# Patient Record
Sex: FEMALE | Race: WHITE | Employment: OTHER | URBAN - METROPOLITAN AREA
[De-identification: names, ages, dates, MRNs, and addresses within clinical notes are randomized per-mention and may not be internally consistent; named-entity substitution may affect disease eponyms.]

---

## 2021-01-01 ENCOUNTER — HOSPITAL ENCOUNTER (INPATIENT)
Age: 71
LOS: 1 days | DRG: 871 | End: 2021-10-03
Attending: INTERNAL MEDICINE | Admitting: INTERNAL MEDICINE
Payer: MEDICARE

## 2021-01-01 ENCOUNTER — APPOINTMENT (OUTPATIENT)
Dept: GENERAL RADIOLOGY | Age: 71
DRG: 871 | End: 2021-01-01
Attending: INTERNAL MEDICINE
Payer: MEDICARE

## 2021-01-01 VITALS
WEIGHT: 113.98 LBS | TEMPERATURE: 95.5 F | SYSTOLIC BLOOD PRESSURE: 55 MMHG | HEIGHT: 62 IN | DIASTOLIC BLOOD PRESSURE: 33 MMHG | BODY MASS INDEX: 20.97 KG/M2

## 2021-01-01 LAB
A/G RATIO: 0.5 (ref 1.1–2.2)
ALBUMIN SERPL-MCNC: 1.9 G/DL (ref 3.4–5)
ALP BLD-CCNC: 110 U/L (ref 40–129)
ALT SERPL-CCNC: 10 U/L (ref 10–40)
ANION GAP SERPL CALCULATED.3IONS-SCNC: 14 MMOL/L (ref 3–16)
ANION GAP SERPL CALCULATED.3IONS-SCNC: 15 MMOL/L (ref 3–16)
ANION GAP SERPL CALCULATED.3IONS-SCNC: 17 MMOL/L (ref 3–16)
ANISOCYTOSIS: ABNORMAL
AST SERPL-CCNC: 35 U/L (ref 15–37)
BASOPHILS ABSOLUTE: 0 K/UL (ref 0–0.2)
BASOPHILS RELATIVE PERCENT: 0 %
BILIRUB SERPL-MCNC: <0.2 MG/DL (ref 0–1)
BILIRUBIN URINE: ABNORMAL
BLOOD, URINE: ABNORMAL
BUN BLDV-MCNC: 89 MG/DL (ref 7–20)
BUN BLDV-MCNC: 90 MG/DL (ref 7–20)
BUN BLDV-MCNC: 92 MG/DL (ref 7–20)
CALCIUM SERPL-MCNC: 8.6 MG/DL (ref 8.3–10.6)
CALCIUM SERPL-MCNC: 8.8 MG/DL (ref 8.3–10.6)
CALCIUM SERPL-MCNC: 8.9 MG/DL (ref 8.3–10.6)
CHLORIDE BLD-SCNC: 102 MMOL/L (ref 99–110)
CHLORIDE BLD-SCNC: 103 MMOL/L (ref 99–110)
CHLORIDE BLD-SCNC: 104 MMOL/L (ref 99–110)
CLARITY: ABNORMAL
CO2: 14 MMOL/L (ref 21–32)
CO2: 14 MMOL/L (ref 21–32)
CO2: 17 MMOL/L (ref 21–32)
COARSE CASTS, UA: ABNORMAL /LPF (ref 0–2)
COLOR: ABNORMAL
CREAT SERPL-MCNC: 5.1 MG/DL (ref 0.6–1.2)
CREAT SERPL-MCNC: 5.1 MG/DL (ref 0.6–1.2)
CREAT SERPL-MCNC: 5.2 MG/DL (ref 0.6–1.2)
EOSINOPHILS ABSOLUTE: 0 K/UL (ref 0–0.6)
EOSINOPHILS RELATIVE PERCENT: 0 %
EPITHELIAL CELLS, UA: 4 /HPF (ref 0–5)
GFR AFRICAN AMERICAN: 10
GFR NON-AFRICAN AMERICAN: 8
GLOBULIN: 3.7 G/DL
GLUCOSE BLD-MCNC: 108 MG/DL (ref 70–99)
GLUCOSE BLD-MCNC: 159 MG/DL (ref 70–99)
GLUCOSE BLD-MCNC: 214 MG/DL (ref 70–99)
GLUCOSE URINE: 250 MG/DL
HCT VFR BLD CALC: 28.7 % (ref 36–48)
HEMOGLOBIN: 9.3 G/DL (ref 12–16)
INR BLD: 1.92 (ref 0.88–1.12)
KETONES, URINE: ABNORMAL MG/DL
LACTIC ACID: 1.7 MMOL/L (ref 0.4–2)
LEUKOCYTE ESTERASE, URINE: ABNORMAL
LYMPHOCYTES ABSOLUTE: 4 K/UL (ref 1–5.1)
LYMPHOCYTES RELATIVE PERCENT: 18 %
MAGNESIUM: 2.4 MG/DL (ref 1.8–2.4)
MCH RBC QN AUTO: 32.9 PG (ref 26–34)
MCHC RBC AUTO-ENTMCNC: 32.5 G/DL (ref 31–36)
MCV RBC AUTO: 101.3 FL (ref 80–100)
MICROSCOPIC EXAMINATION: YES
MONOCYTES ABSOLUTE: 0.7 K/UL (ref 0–1.3)
MONOCYTES RELATIVE PERCENT: 3 %
NEUTROPHILS ABSOLUTE: 17.5 K/UL (ref 1.7–7.7)
NEUTROPHILS RELATIVE PERCENT: 79 %
NITRITE, URINE: NEGATIVE
PDW BLD-RTO: 16.6 % (ref 12.4–15.4)
PH UA: 5.5 (ref 5–8)
PHOSPHORUS: 6.3 MG/DL (ref 2.5–4.9)
PLATELET # BLD: 350 K/UL (ref 135–450)
PLATELET SLIDE REVIEW: ADEQUATE
PMV BLD AUTO: 6.9 FL (ref 5–10.5)
POTASSIUM SERPL-SCNC: 5.4 MMOL/L (ref 3.5–5.1)
POTASSIUM SERPL-SCNC: 6.1 MMOL/L (ref 3.5–5.1)
POTASSIUM SERPL-SCNC: 6.1 MMOL/L (ref 3.5–5.1)
PROTEIN UA: >=300 MG/DL
PROTHROMBIN TIME: 22.3 SEC (ref 9.9–12.7)
RBC # BLD: 2.83 M/UL (ref 4–5.2)
RBC UA: >100 /HPF (ref 0–4)
REASON FOR REJECTION: NORMAL
REJECTED TEST: NORMAL
SLIDE REVIEW: ABNORMAL
SODIUM BLD-SCNC: 133 MMOL/L (ref 136–145)
SODIUM BLD-SCNC: 133 MMOL/L (ref 136–145)
SODIUM BLD-SCNC: 134 MMOL/L (ref 136–145)
SPECIFIC GRAVITY UA: 1.02 (ref 1–1.03)
TOTAL PROTEIN: 5.6 G/DL (ref 6.4–8.2)
URINE REFLEX TO CULTURE: ABNORMAL
URINE TYPE: ABNORMAL
UROBILINOGEN, URINE: 0.2 E.U./DL
WBC # BLD: 22.1 K/UL (ref 4–11)
WBC UA: 8 /HPF (ref 0–5)

## 2021-01-01 PROCEDURE — 80053 COMPREHEN METABOLIC PANEL: CPT

## 2021-01-01 PROCEDURE — 83605 ASSAY OF LACTIC ACID: CPT

## 2021-01-01 PROCEDURE — 6370000000 HC RX 637 (ALT 250 FOR IP): Performed by: INTERNAL MEDICINE

## 2021-01-01 PROCEDURE — 94761 N-INVAS EAR/PLS OXIMETRY MLT: CPT

## 2021-01-01 PROCEDURE — 2580000003 HC RX 258: Performed by: INTERNAL MEDICINE

## 2021-01-01 PROCEDURE — 81001 URINALYSIS AUTO W/SCOPE: CPT

## 2021-01-01 PROCEDURE — 94640 AIRWAY INHALATION TREATMENT: CPT

## 2021-01-01 PROCEDURE — 71045 X-RAY EXAM CHEST 1 VIEW: CPT

## 2021-01-01 PROCEDURE — 83735 ASSAY OF MAGNESIUM: CPT

## 2021-01-01 PROCEDURE — 6360000002 HC RX W HCPCS: Performed by: STUDENT IN AN ORGANIZED HEALTH CARE EDUCATION/TRAINING PROGRAM

## 2021-01-01 PROCEDURE — 2580000003 HC RX 258: Performed by: STUDENT IN AN ORGANIZED HEALTH CARE EDUCATION/TRAINING PROGRAM

## 2021-01-01 PROCEDURE — 84100 ASSAY OF PHOSPHORUS: CPT

## 2021-01-01 PROCEDURE — 0BH17EZ INSERTION OF ENDOTRACHEAL AIRWAY INTO TRACHEA, VIA NATURAL OR ARTIFICIAL OPENING: ICD-10-PCS | Performed by: STUDENT IN AN ORGANIZED HEALTH CARE EDUCATION/TRAINING PROGRAM

## 2021-01-01 PROCEDURE — 2700000000 HC OXYGEN THERAPY PER DAY

## 2021-01-01 PROCEDURE — 94002 VENT MGMT INPAT INIT DAY: CPT

## 2021-01-01 PROCEDURE — 2000000000 HC ICU R&B

## 2021-01-01 PROCEDURE — 85610 PROTHROMBIN TIME: CPT

## 2021-01-01 PROCEDURE — 6360000002 HC RX W HCPCS: Performed by: INTERNAL MEDICINE

## 2021-01-01 PROCEDURE — 2500000003 HC RX 250 WO HCPCS: Performed by: INTERNAL MEDICINE

## 2021-01-01 PROCEDURE — 5A1935Z RESPIRATORY VENTILATION, LESS THAN 24 CONSECUTIVE HOURS: ICD-10-PCS | Performed by: STUDENT IN AN ORGANIZED HEALTH CARE EDUCATION/TRAINING PROGRAM

## 2021-01-01 PROCEDURE — 85025 COMPLETE CBC W/AUTO DIFF WBC: CPT

## 2021-01-01 PROCEDURE — 87040 BLOOD CULTURE FOR BACTERIA: CPT

## 2021-01-01 PROCEDURE — 36415 COLL VENOUS BLD VENIPUNCTURE: CPT

## 2021-01-01 RX ORDER — ALBUTEROL SULFATE 90 UG/1
4 AEROSOL, METERED RESPIRATORY (INHALATION) EVERY 4 HOURS PRN
Status: DISCONTINUED | OUTPATIENT
Start: 2021-01-01 | End: 2021-10-04 | Stop reason: HOSPADM

## 2021-01-01 RX ORDER — SODIUM CHLORIDE 9 MG/ML
25 INJECTION, SOLUTION INTRAVENOUS PRN
Status: DISCONTINUED | OUTPATIENT
Start: 2021-01-01 | End: 2021-10-04 | Stop reason: HOSPADM

## 2021-01-01 RX ORDER — SODIUM CHLORIDE 0.9 % (FLUSH) 0.9 %
5-40 SYRINGE (ML) INJECTION PRN
Status: DISCONTINUED | OUTPATIENT
Start: 2021-01-01 | End: 2021-10-04 | Stop reason: HOSPADM

## 2021-01-01 RX ORDER — ALBUTEROL SULFATE 2.5 MG/3ML
10 SOLUTION RESPIRATORY (INHALATION) ONCE
Status: COMPLETED | OUTPATIENT
Start: 2021-01-01 | End: 2021-01-01

## 2021-01-01 RX ORDER — POLYETHYLENE GLYCOL 3350 17 G/17G
17 POWDER, FOR SOLUTION ORAL DAILY PRN
Status: DISCONTINUED | OUTPATIENT
Start: 2021-01-01 | End: 2021-10-04 | Stop reason: HOSPADM

## 2021-01-01 RX ORDER — ONDANSETRON 2 MG/ML
4 INJECTION INTRAMUSCULAR; INTRAVENOUS EVERY 6 HOURS PRN
Status: DISCONTINUED | OUTPATIENT
Start: 2021-01-01 | End: 2021-10-04 | Stop reason: HOSPADM

## 2021-01-01 RX ORDER — ACETAMINOPHEN 650 MG/1
650 SUPPOSITORY RECTAL EVERY 6 HOURS PRN
Status: DISCONTINUED | OUTPATIENT
Start: 2021-01-01 | End: 2021-10-04 | Stop reason: HOSPADM

## 2021-01-01 RX ORDER — ONDANSETRON 4 MG/1
4 TABLET, ORALLY DISINTEGRATING ORAL EVERY 8 HOURS PRN
Status: DISCONTINUED | OUTPATIENT
Start: 2021-01-01 | End: 2021-10-04 | Stop reason: HOSPADM

## 2021-01-01 RX ORDER — DEXTROSE MONOHYDRATE 25 G/50ML
25 INJECTION, SOLUTION INTRAVENOUS ONCE
Status: COMPLETED | OUTPATIENT
Start: 2021-01-01 | End: 2021-01-01

## 2021-01-01 RX ORDER — LORAZEPAM 2 MG/ML
1 INJECTION INTRAMUSCULAR
Status: DISCONTINUED | OUTPATIENT
Start: 2021-01-01 | End: 2021-10-04 | Stop reason: HOSPADM

## 2021-01-01 RX ORDER — LORAZEPAM 2 MG/ML
1 INJECTION INTRAMUSCULAR EVERY 4 HOURS PRN
Status: DISCONTINUED | OUTPATIENT
Start: 2021-01-01 | End: 2021-01-01

## 2021-01-01 RX ORDER — ACETAMINOPHEN 325 MG/1
650 TABLET ORAL EVERY 6 HOURS PRN
Status: DISCONTINUED | OUTPATIENT
Start: 2021-01-01 | End: 2021-10-04 | Stop reason: HOSPADM

## 2021-01-01 RX ORDER — ALBUTEROL SULFATE 2.5 MG/3ML
2.5 SOLUTION RESPIRATORY (INHALATION) EVERY 4 HOURS PRN
Status: DISCONTINUED | OUTPATIENT
Start: 2021-01-01 | End: 2021-10-04 | Stop reason: HOSPADM

## 2021-01-01 RX ORDER — SODIUM CHLORIDE 0.9 % (FLUSH) 0.9 %
5-40 SYRINGE (ML) INJECTION EVERY 12 HOURS SCHEDULED
Status: DISCONTINUED | OUTPATIENT
Start: 2021-01-01 | End: 2021-10-04 | Stop reason: HOSPADM

## 2021-01-01 RX ORDER — NOREPINEPHRINE BIT/0.9 % NACL 16MG/250ML
2-100 INFUSION BOTTLE (ML) INTRAVENOUS CONTINUOUS
Status: DISCONTINUED | OUTPATIENT
Start: 2021-01-01 | End: 2021-10-04 | Stop reason: HOSPADM

## 2021-01-01 RX ORDER — HEPARIN SODIUM 5000 [USP'U]/ML
5000 INJECTION, SOLUTION INTRAVENOUS; SUBCUTANEOUS EVERY 8 HOURS SCHEDULED
Status: DISCONTINUED | OUTPATIENT
Start: 2021-01-01 | End: 2021-01-01

## 2021-01-01 RX ORDER — IPRATROPIUM BROMIDE AND ALBUTEROL SULFATE 2.5; .5 MG/3ML; MG/3ML
1 SOLUTION RESPIRATORY (INHALATION) 4 TIMES DAILY
Status: DISCONTINUED | OUTPATIENT
Start: 2021-01-01 | End: 2021-10-04 | Stop reason: HOSPADM

## 2021-01-01 RX ORDER — CHLORHEXIDINE GLUCONATE 0.12 MG/ML
15 RINSE ORAL 2 TIMES DAILY
Status: DISCONTINUED | OUTPATIENT
Start: 2021-01-01 | End: 2021-01-01

## 2021-01-01 RX ADMIN — Medication 10 ML: at 11:00

## 2021-01-01 RX ADMIN — LORAZEPAM 1 MG: 2 INJECTION INTRAMUSCULAR; INTRAVENOUS at 17:40

## 2021-01-01 RX ADMIN — DEXTROSE MONOHYDRATE 25 G: 25 INJECTION, SOLUTION INTRAVENOUS at 11:38

## 2021-01-01 RX ADMIN — MORPHINE SULFATE 7 MG/HR: 10 INJECTION INTRAVENOUS at 17:33

## 2021-01-01 RX ADMIN — FAMOTIDINE 20 MG: 10 INJECTION, SOLUTION INTRAVENOUS at 10:11

## 2021-01-01 RX ADMIN — Medication 10 ML: at 10:19

## 2021-01-01 RX ADMIN — IPRATROPIUM BROMIDE AND ALBUTEROL SULFATE 1 AMPULE: .5; 3 SOLUTION RESPIRATORY (INHALATION) at 08:03

## 2021-01-01 RX ADMIN — CEFEPIME HYDROCHLORIDE 1000 MG: 1 INJECTION, POWDER, FOR SOLUTION INTRAMUSCULAR; INTRAVENOUS at 11:01

## 2021-01-01 RX ADMIN — INSULIN HUMAN 10 UNITS: 100 INJECTION, SOLUTION PARENTERAL at 11:37

## 2021-01-01 RX ADMIN — FAMOTIDINE 20 MG: 10 INJECTION, SOLUTION INTRAVENOUS at 10:59

## 2021-01-01 RX ADMIN — HEPARIN SODIUM 5000 UNITS: 5000 INJECTION INTRAVENOUS; SUBCUTANEOUS at 10:15

## 2021-01-01 RX ADMIN — ALBUTEROL SULFATE 10 MG: 2.5 SOLUTION RESPIRATORY (INHALATION) at 12:14

## 2021-01-01 RX ADMIN — SODIUM BICARBONATE: 84 INJECTION, SOLUTION INTRAVENOUS at 11:36

## 2021-01-01 RX ADMIN — IPRATROPIUM BROMIDE AND ALBUTEROL SULFATE 1 AMPULE: .5; 3 SOLUTION RESPIRATORY (INHALATION) at 16:26

## 2021-01-01 RX ADMIN — MORPHINE SULFATE 5 MG/HR: 10 INJECTION INTRAVENOUS at 14:40

## 2021-01-01 RX ADMIN — MORPHINE SULFATE 8 MG/HR: 10 INJECTION INTRAVENOUS at 02:01

## 2021-01-01 RX ADMIN — IPRATROPIUM BROMIDE AND ALBUTEROL SULFATE 1 AMPULE: .5; 3 SOLUTION RESPIRATORY (INHALATION) at 08:16

## 2021-01-01 RX ADMIN — CEFEPIME HYDROCHLORIDE 1000 MG: 1 INJECTION, POWDER, FOR SOLUTION INTRAMUSCULAR; INTRAVENOUS at 10:04

## 2021-01-01 RX ADMIN — LORAZEPAM 1 MG: 2 INJECTION INTRAMUSCULAR; INTRAVENOUS at 21:14

## 2021-01-01 ASSESSMENT — PAIN SCALES - GENERAL
PAINLEVEL_OUTOF10: 0

## 2021-01-01 ASSESSMENT — PULMONARY FUNCTION TESTS
PIF_VALUE: 33
PIF_VALUE: 26

## 2021-01-01 ASSESSMENT — PAIN SCALES - WONG BAKER
WONGBAKER_NUMERICALRESPONSE: 0

## 2021-10-02 PROBLEM — J96.90 RESPIRATORY FAILURE (HCC): Status: ACTIVE | Noted: 2021-01-01

## 2021-10-02 NOTE — PLAN OF CARE
Transfer note (patient coming from [facility] ER in [place]):    79-year-old female with history of COPD, lung cancer, who was on hospice until it was revoked on 10/1/2021 (according to information obtained from emergency room physician), who presented to the emergency room with complaints of shortness of breath. Patient was noted to be hypoxic with oxygen saturation of 82% on presentation to the emergency room. Blood gas obtained in the emergency room with pH of 7.16. Per new CODE STATUS changes (2 full code) from son, patient was then endotracheally intubated and placed on ventilator support. Abnormal labs in the emergency room include potassium of 7.1 (treated with insulin, Kayexalate, bicarb, calcium; most recent potassium prior to decision to transfer was 6.6), leukocytosis of 19,000, BUN of 108, creatinine of 5.6 (baseline was 1.2). Chest x-ray reveals left pleural effusion. Patient received a dose of vancomycin and Rocephin in the emergency room. Plan is to transfer to [facility] for further management. If patient arrives to the unit after 7 AM, please page on-call physician for orders and for patient to be seen. Do not page Dr. Batool Siu after 7 AM. Thank you.         SIGNED: Aga Oakley MD, MPH . 10/2/2021

## 2021-10-02 NOTE — PROGRESS NOTES
Pt son Matthieu Barrientosws [phone] called unit for update. Updated on pt status. Pt son reports his mom stated she didn't want to suffer and he wants to honor her wishes. Discussed at length code options as pt is currently full code. Pt son stated he would like his mom to be made a DNR-CCA for now and he would call around 12pm after he made some phone calls to family. He is considering withdrawal of care but needs more time to process. He stated he will call back with update. Martha sent to Hospitalist with pt son call back number for code status changes.  Matthew Saavedra, BSN, RN

## 2021-10-02 NOTE — CONSULTS
[facility]                     [facility], [address]                                  CONSULTATION    PATIENT NAME: Erick Muir                     :        1950  MED REC NO:   [id number]                          ROOM:       [id number]  ACCOUNT NO:   [de-identified]                           ADMIT DATE: 10/02/2021  PROVIDER:     Kathe Fritz MD    RENAL CONSULTATION    CONSULT DATE:  10/02/2021    CONSULTING PHYSICIAN:  Kathe Fritz MD    REFERRING PROVIDER:  Luis Eduardo Casiano MD    REASON FOR CONSULTATION:  Acute kidney injury, hyperkalemia, metabolic  acidosis, hyponatremia. HISTORY OF PRESENT ILLNESS:  The patient is a 40-year-old female with  history of lung cancer, malignant pleural effusion, previously on  hospice and revoked 10/01/2021 according to ED physician, TINO, who  presented to the hospital secondary to acute respiratory failure with  hypoxia. She was transferred from [address] [place] [address]. She is currently DNR CCA. Her admission diagnosis includes acute respiratory failure plus  suspected bacterial pneumonia plus TIFFANIE and hyperkalemia. .  Her  creatinine from [address] [place] [address] was up to 5.1 with potassium in the 7s range. Serum bicarbonate of 20. The pH was about 7.16 with a pCO2 of 50. The  patient is currently on the ventilator in the ICU. She is requiring  Levophed. Systolic blood pressure currently in the 110s. Potassium has  improved to 6.1 with a serum bicarbonate of 14. She has a Moody  catheter with brownish urine. Most of this information is from records. PAST MEDICAL HISTORY:  Includes metastatic lung cancer, malignant  pleural effusion, COPD, previously on hospice. ALLERGIES:  Not obtained. CURRENT MEDICATIONS:  Includes cefepime, famotidine, heparin subcu,  ipratropium plus albuterol, vancomycin, Levophed, sodium bicarbonate. SOCIAL HISTORY:  Came from [address] [place] [address], unable to fully obtain. FAMILY HISTORY:  Noncontributory.     REVIEW [id number]    CC:

## 2021-10-02 NOTE — PROGRESS NOTES
Pt admit from  [facility] for Acute Respiratory Failure.  Air flight arrived on unit. Pt intubated with #7 at 23cm lip. RT placed pt juan miguel for ETT. AC 12, , Peep 5, Fi02 50%. Levophed infusing at 4mcg/min, Propofol infusing at 20mcg/kg/min through Right fem CVC Triple lumen that was placed at prior facility. RAC PIV- saline locked. Demarco- also placed at prior facility. Pt responds to pain. Pt bathed with CHG wipes, green demarco wipes utilized as well. Gown changed, all linens changed fresh pad. Mepilex applied to coccyx. Pt has left lateral chest pleurex catheter- drsg not intact, yellow/brown drainage to gauze. Cleansed with chlorhexidine, split gauze/tegaderm applied. Pt has DTI to left heel- boggy. R Heel- blanchable redness. Heels offloaded on pillows. Petechiae generalized to neck/chest. Buttocks have light brown discoloration. Fall/safety precautions in place.  Verna Gitelman, JEMALN, RN

## 2021-10-02 NOTE — PLAN OF CARE
Nutrition Problem #1: Inadequate oral intake  Intervention: Food and/or Nutrient Delivery: Continue NPO  Nutritional Goals: nutrition as appropriate

## 2021-10-02 NOTE — H&P
Hospital Medicine History and Physical    10/2/2021    Date of Admission: 10/2/2021    Date of Service: Pt seen/examined on 10/2/2021 and admitted to inpatient. Assessment/plan:  1. Acute respiratory failure with hypoxia. Etiology is multifactorial, including bacterial pneumonia, malignant pleural effusion in the setting of underlying lung cancer. Will obtain chest x-ray. Will send for cultures. Continue ventilator support. Critical care consult to assist with management. 2. Bacterial pneumonia, suspected. Rule out infected pleural effusion. Send for blood cultures, antigen studies. Continue vancomycin and cefepime. Pulmonology consult as noted above. 3. Acute kidney injury. Possibly ATN versus prerenal azotemia in the setting of dehydration. Continue intravenous fluid. Avoid nephrotoxic medications. Monitor renal function closely. 4. Hyperkalemia. Likely secondary to underlying diminished renal excretion in the setting of TIFFANIE. Initially treated in the emergency room, potassium down to 6.6. Repeat labs currently pending. Continue telemetry monitoring. 5. Other comorbidities: history of COPD, lung cancer status post left Pleurx catheter placement. 6. Other issues: Nursing communication sent to obtain records from [facility].  7. Prognosis. Poor, especially with metastatic lung cancer. Consult has been initiated to palliative and hospice. Activities: Bedrest  Prophylaxis: Subcutaneous heparin  Code status: Full code    ==========================================================  Chief complaint:  Shortness of breath. History of Presenting Illness:  80-year-old female with history of COPD, lung cancer status post left Pleurx catheter placement, who was on hospice until it was revoked on 10/1/2021 (according to information obtained from emergency room physician), who presented to the emergency room with complaints of shortness of breath.   Patient was noted to be hypoxic with oxygen saturation of 82% on presentation to the emergency room.     Blood gas obtained in the emergency room with pH of 7.16. Per new CODE STATUS changes (from DNR to full code) from son, patient was then endotracheally intubated and placed on ventilator support. Abnormal labs in the emergency room include potassium of 7.1 (treated with insulin, Kayexalate, bicarb, calcium; most recent potassium prior to decision to transfer was 6.6), leukocytosis of 19,000, BUN of 108, creatinine of 5.6 (baseline was 1.2). Chest x-ray reveals left pleural effusion. Patient received a dose of vancomycin and Rocephin in the emergency room. Plan is to transfer to [facility] for further management. Past Medical History:  Unable to obtain as patient is currently endotracheally intubated, sedated, on vent. Past Surgical History:  Unable to obtain as patient is currently endotracheally intubated, sedated, on vent. Medications (prior to admission): Unable to obtain as patient is currently endotracheally intubated, sedated, on vent. Allergy(ies): Unable to obtain as patient is currently endotracheally intubated, sedated, on vent. Social History:  Unable to obtain as patient is currently endotracheally intubated, sedated, on vent. Family History:  Unable to obtain as patient is currently endotracheally intubated, sedated, on vent. Review of Systems:  Unable to obtain as patient is currently endotracheally intubated, sedated, on vent. Vitals and physical examination:  Blood pressure 101/60. Heart rate 85 bpm.  Oxygen saturation 100% on ventilator. Temperature pending. Gen/overall appearance: Sedated, intubated. Head: Normocephalic, atraumatic  Eyes: Pupils equal bilaterally  ENT:- ETT in place  Neck: No JVD, thyromegaly  CVS: Nml S1S2, no MRG, RRR  Pulm: Clear bilaterally. No crackles/wheezes  Gastrointestinal: Soft, NT/ND, +BS  Musculoskeletal: No edema.  Warm  Neuro: Sedated, intubated  Psychiatry: Unable to assess  Skin: No obvious rashes noted      Labs/imaging/EKG:  Repeat labs, including CBC, CMP, magnesium, phosphorus level, lactic acid level, currently pending. Discussed with ER provider.       Thank you Florinda Belcher for the opportunity to be involved in this patient's care.    -----------------------------  Jina Coy MD  [facility]

## 2021-10-02 NOTE — PROGRESS NOTES
Pt extubated at this time. Placed on 2L nc. Morphine gtt at 6mg/hr see mar. Pt does not appear to be in any distress at this time.

## 2021-10-02 NOTE — CONSULTS
Comprehensive Nutrition Assessment    Type and Reason for Visit:  Initial, Consult    Nutrition Recommendations/Plan:   1. Not appropriate for nutrition support at this time  2. Consult RD based on POC and nutrition goals    Nutrition Assessment:  Consult received for tube feed order and manage per vent order set. Pt intubated and and receiving levo at 10 mcg/min. Nephrology and pulmonology following. Pt previously with hospice care for metastatic lung cancer. Not appropriate at this time for nutrition support. Will follow for POC. Malnutrition Assessment:  Malnutrition Status:  Insufficient data        Nutrition Related Findings:  Na+ 133, K+6.1; starting Na+ bicarb in D5% IV fluid and lokelma      Current Nutrition Therapies:    Diet NPO    Anthropometric Measures:  · Height:  not available  · Current Body Weight: 114 lb (51.7 kg)       Nutrition Diagnosis:   · Inadequate oral intake related to impaired respiratory function as evidenced by intubation, NPO or clear liquid status due to medical condition      Nutrition Interventions:   Food and/or Nutrient Delivery:  Continue NPO  Nutrition Education/Counseling:  Education not indicated   Coordination of Nutrition Care:  Continue to monitor while inpatient    Goals:  nutrition as appropriate       Nutrition Monitoring and Evaluation:   Behavioral-Environmental Outcomes:  None Identified   Food/Nutrient Intake Outcomes:  Diet Advancement/Tolerance, IVF Intake  Physical Signs/Symptoms Outcomes:  Biochemical Data     Discharge Planning:     Too soon to determine     Electronically signed by Mari Garcia RD, IVONE on 10/2/21 at 12:04 PM EDT    Weekend Contact: [phone], leave name and callback number

## 2021-10-03 NOTE — PROGRESS NOTES
Hospitalist Progress Note      PCP: Omkar Ellison    Date of Admission: 10/2/2021    Chief Complaint: Acute hypoxic respiratory failure    Hospital Course:     27-year-old female with history of COPD, lung cancer status post left Pleurx catheter placement, who was on hospice until it was revoked on 10/1/2021 (according to information obtained from emergency room physician), who presented to the emergency room with complaints of shortness of breath.  Patient was noted to be hypoxic with oxygen saturation of 82% on presentation to the emergency room. Subjective:     Patient is terminally extubated. DNR-CC, with comfort measures. Medications:  Reviewed    Infusion Medications    sodium chloride      norepinephrine Stopped (10/02/21 1248)    morphine infusion 1 mg/mL 7 mg/hr (10/03/21 1122)     Scheduled Medications    sodium chloride flush  5-40 mL IntraVENous 2 times per day    ipratropium-albuterol  1 ampule Inhalation 4x daily    heparin (porcine)  5,000 Units SubCUTAneous 3 times per day    cefepime  1,000 mg IntraVENous Q24H    famotidine (PEPCID) injection  20 mg IntraVENous Daily     PRN Meds: albuterol sulfate HFA **AND** ipratropium, sodium chloride flush, sodium chloride, ondansetron **OR** ondansetron, polyethylene glycol, acetaminophen **OR** acetaminophen, albuterol, LORazepam      Intake/Output Summary (Last 24 hours) at 10/3/2021 1214  Last data filed at 10/3/2021 1104  Gross per 24 hour   Intake 252.76 ml   Output 100 ml   Net 152.76 ml       Physical Exam Performed:    BP (!) 68/40   Pulse 102   Temp 96.5 °F (35.8 °C) (Temporal)   Resp 14   Wt 113 lb 15.7 oz (51.7 kg)   SpO2 (!) 81%     General appearance: Extubated  HEENT: Pupils equal, round, and reactive to light. Conjunctivae/corneas clear. Neck: Supple, with full range of motion. No jugular venous distention. Trachea midline. Respiratory:  Normal respiratory effort.  Clear to auscultation, bilaterally without Rales/Wheezes/Rhonchi. Cardiovascular: Regular rate and rhythm with normal S1/S2 without murmurs, rubs or gallops. Abdomen: Soft, non-tender, non-distended with normal bowel sounds. Musculoskeletal: No clubbing, cyanosis or edema bilaterally. Full range of motion without deformity. Skin: Skin color, texture, turgor normal.  No rashes or lesions. Neurologic: Withdrawing to pain  Psychiatric: Calm  Capillary Refill: Brisk,3 seconds, normal   Peripheral Pulses: +2 palpable, equal bilaterally       Labs:   Recent Labs     10/02/21  1035   WBC 22.1*   HGB 9.3*   HCT 28.7*        Recent Labs     10/02/21  0721 10/02/21  1035 10/02/21  1230   * 133* 134*   K 6.1* 6.1* 5.4*    104 103   CO2 14* 14* 17*   BUN 89* 90* 92*   CREATININE 5.1* 5.1* 5.2*   CALCIUM 8.9 8.8 8.6   PHOS 6.3*  --   --      Recent Labs     10/02/21  0721   AST 35   ALT 10   BILITOT <0.2   ALKPHOS 110     Recent Labs     10/02/21  0721   INR 1.92*     No results for input(s): Iris [place] in the last 72 hours. Urinalysis:      Lab Results   Component Value Date    NITRU Negative 10/02/2021    WBCUA 8 10/02/2021    RBCUA >100 10/02/2021    BLOODU LARGE 10/02/2021    SPECGRAV 1.020 10/02/2021    GLUCOSEU 250 10/02/2021       Radiology:  XR CHEST PORTABLE   Final Result   1. Left chest tube in place with small loculated pleural effusion. No   pneumothorax. 2. Apparent asymmetric left-sided pulmonary vascular congestion. Assessment/Plan:    Active Hospital Problems    Diagnosis     Respiratory failure ([facility] [address] [J96.90]      Acute hypoxic respiratory failure, etiology is moderate to factorial, bacterial pneumonia, malignant pleural effusion in setting of underlying lung cancer.   Acute renal failure, probably ATN, versus prerenal azotemia  History of COPD  Lung cancer status post left Pleurx catheter placement  Patient was admitted to hospice prior to this admission  Hyperkalemia likely secondary to TIFFANIE    Patient was terminally extubated on 10/2/21  Started on morphine drip and Ativan as needed anxiety and agitation    Family wants to hold off hospice, continue comfort measure in hospital    CODE STATUS has been changed to DO NOT RESUSCITATE-comfort measures only    DVT Prophylaxis: None  Diet: Diet NPO  Code Status: DNR-CC    PT/OT Eval Status: Not needed    Dispo -patient can be transfer out of ICU to medical floor, continue comfort measure, will consult hospice if family wants to talk to them.     Valarie Anderson MD

## 2021-10-03 NOTE — PROGRESS NOTES
Physician Progress Note      Luma Hogue  [facility] #:                  [id number]  :                       1950  ADMIT DATE:       10/2/2021 6:43 AM  DISCH DATE:  RESPONDING  PROVIDER #:        YODIT FUNES          QUERY TEXT:    Pt admitted with PNA, transferred from an outside ED. Pt noted to have PNA,   WBC 22.1, temperature 96.6, no lactic acid, CRP or procalcitonin level   available. TIFFANIE with possible ATN, acute respiratory failure requiring   intubation and requiring Levophed to maintain blood pressure. If possible,   please document in the progress notes and discharge summary if you are   evaluating and /or treating any of the following: The medical record reflects the following:  Risk Factors: PNA, TIFFANIE with possible ATN, lung CA, age  Clinical Indicators: Pt admitted with PNA, transferred from an outside ED. Pt   noted to have PNA, WBC 22.1, temperature 96.6, no lactic acid, CRP or   procalcitonin level available. TIFFANIE with possible ATN, acute respiratory   failure requiring intubation and requiring Levophed to maintain blood   pressure. Treatment: Levophed, sodium bicarbonate, cefepime possibly other medications   from outside ED, unable to see their notes. Options provided:  -- Sepsis with septic shock, present on admission  -- Sepsis without shock, present on admission  -- Pneumonia without Sepsis  -- Other - I will add my own diagnosis  -- Disagree - Not applicable / Not valid  -- Disagree - Clinically unable to determine / Unknown  -- Refer to Clinical Documentation Reviewer    PROVIDER RESPONSE TEXT:    This patient has sepsis without shock which was present on admission.     Query created by: Milagros Stovall on 10/2/2021 2:25 PM      Electronically signed by:  Ita Smith 10/3/2021 3:28 PM

## 2021-10-03 NOTE — PROGRESS NOTES
Notice of Death    Time of Death:10/3/2021    Time Pronounced: 18:40 pm    I personally evaluated the pt at bedside. Pt was found to be without cardiac activity on monitor. Brain stem reflexes were absent. There was no pupillary response. Pupils were fixed and dilated with no corneal or oculocephalic reflex. No gag reflex. Pt was areflexic and without any spontaneous respirations or respiratory movements. No palpable pulses throughout. No response to painful stimuli.      Physical Exam:  VS:        No palpable pulse, no blood pressure   GEN:     Pallor with no spontaneous movement   HEENT: Pupils fixed and dilated   CVS:      No heart sounds audible   Chest:    No audible lung sounds  Neuro:   Absent reflexes    Jacqualine Primrose, MD  Hospitalist  10/3/2021 6:48 PM

## 2021-10-03 NOTE — PROGRESS NOTES
[facility] Hospital Note    Patient Active Problem List   Diagnosis    Respiratory failure [facility])       Past Medical History:   has no past medical history on file. Past Social History:   not obtained. Subjective:  Unable to cooperate. Extubated. Now DNR-CC    Review of Systems unable to obtain    Objective:      BP (!) 71/34   Pulse 102   Temp 96.5 °F (35.8 °C) (Temporal)   Resp 14   Wt 113 lb 15.7 oz (51.7 kg)   SpO2 96%     Wt Readings from Last 3 Encounters:   10/03/21 113 lb 15.7 oz (51.7 kg)       BP Readings from Last 3 Encounters:   10/03/21 (!) 71/34     Chest- rhonchi  Heart-regular  Abd-soft  Ext- no edema    Labs  Hemoglobin   Date Value Ref Range Status   10/02/2021 9.3 (L) 12.0 - 16.0 g/dL Final     Hematocrit   Date Value Ref Range Status   10/02/2021 28.7 (L) 36.0 - 48.0 % Final     WBC   Date Value Ref Range Status   10/02/2021 22.1 (H) 4.0 - 11.0 K/uL Final     Platelets   Date Value Ref Range Status   10/02/2021 350 135 - 450 K/uL Final     Lab Results   Component Value Date    CREATININE 5.2 ([facility]) 10/02/2021    BUN 92 (HH) 10/02/2021     (L) 10/02/2021    K 5.4 (H) 10/02/2021     10/02/2021    CO2 17 (L) 10/02/2021     Labs today not available    Assessment/Plan:  1. Acute kidney injury, possible ATN. The patient is currently DNR CC. Not an ideal candidate for long-term renal replacement therapy with her history  of metastatic lung cancer. K improved. 2.  Hyperkalemia- better. Stop Keshav Due. 3.  Anion gap metabolic acidosis plus respiratory acidosis in the  setting of TIFFANIE. 4.  Hypotension/sepsis- on antibiotics. 5.  Hyperphosphatemia. No need to treat at this time. 6.  Hypoalbuminemia. 7.  History of metastatic lung cancer with malignant pleural effusion. 8.  Respiratory failure - now extubated  9. DNR-CC. Will sign off.   Thank you.      Yomaira Smiley MD

## 2021-10-03 NOTE — FLOWSHEET NOTE
Vitals:    10/03/21 1330   BP: (!) 68/40   Pulse: 100   Resp: 22   Temp:    SpO2: (!) 76%        10/03/21 1230   Vital Signs   Pulse 103   Resp 15   BP (!) 69/33   MAP (mmHg) (!) 42   SpO2 90 %   CPOT (Critical Patient)   O2 Device Nasal cannula   CPOT (Critial Patient) Vent Status Extubated   CPOT (Critical Patient) Facial Expression 0   CPOT (Critical Patient) Body Movement 0   CPOT (Critical Patient) Muscle Tension 0   CPOT (Critical Patient) Vocalization (extubated) 0   Score CPOT (Extubated) 0       Pt weaned down to 7 mg/hr of morphine now noted increased resp and HR-possible s/s of increased pain. Will hold off weaning to help control pain and ease resp rate.

## 2021-10-06 LAB
BLOOD CULTURE, ROUTINE: NORMAL
CULTURE, BLOOD 2: NORMAL

## 2021-10-28 NOTE — DISCHARGE SUMMARY
Hospital Medicine Discharge Summary    Patient ID: Joel Brown      Patient's PCP: Lemuel Canela Date: 10/2/2021     Discharge Date: 10/4/2021      Admitting Physician: Mahin Pearce MD     Discharge Physician: Himanshu Gauthier MD     Discharge Diagnoses: Active Hospital Problems    Diagnosis     Respiratory failure ([address] [J96.90]        The patient was seen and examined on day of discharge and this discharge summary is in conjunction with any daily progress note from day of discharge. Hospital Course:     59-year-old female with history of COPD, lung cancer status post left Pleurx catheter placement, who was on hospice until it was revoked on 10/1/2021 (according to information obtained from emergency room physician), who presented to the emergency room with complaints of shortness of breath.  Patient was noted to be hypoxic with oxygen saturation of 82% on presentation to the emergency room.       Acute hypoxic respiratory failure, etiology is moderate to factorial, bacterial pneumonia, malignant pleural effusion in setting of underlying lung cancer. Acute renal failure, probably ATN, versus prerenal azotemia  History of COPD  Lung cancer status post left Pleurx catheter placement  Patient was admitted to hospice prior to this admission  Hyperkalemia likely secondary to TIFFANIE     Patient was terminally extubated on 10/2/21  Started on morphine drip and Ativan as needed anxiety and agitation     Family wants to hold off hospice, continue comfort measure in hospital     CODE STATUS has been changed to DO NOT RESUSCITATE-comfort measures only     DVT Prophylaxis: None  Diet: Diet NPO  Code Status: DNR-CC     Dispo -patient can be transfer out of ICU to medical floor, continue comfort measure, will consult hospice if family wants to talk to them.     Comfort care currently per family      Physical Exam Performed:     BP (!) 55/33   Pulse (!) 0   Temp 95.5 °F (35.3 °C) (Temporal)   Resp (!) 0 Ht 5' 2\" (1.575 m)   Wt 113 lb 15.7 oz (51.7 kg)   SpO2 (!) 0%   BMI 20.85 kg/m²       Please refer to death note      Labs: For convenience and continuity at follow-up the following most recent labs are provided:      CBC:    Lab Results   Component Value Date    WBC 22.1 10/02/2021    HGB 9.3 10/02/2021    HCT 28.7 10/02/2021     10/02/2021       Renal:    Lab Results   Component Value Date     10/02/2021    K 5.4 10/02/2021     10/02/2021    CO2 17 10/02/2021    BUN 92 10/02/2021    CREATININE 5.2 10/02/2021    CALCIUM 8.6 10/02/2021    PHOS 6.3 10/02/2021         Significant Diagnostic Studies    Radiology:   XR CHEST PORTABLE   Final Result   1. Left chest tube in place with small loculated pleural effusion. No   pneumothorax. 2. Apparent asymmetric left-sided pulmonary vascular congestion. Consults:     IP CONSULT TO PALLIATIVE CARE  IP CONSULT TO HOSPICE  IP CONSULT TO CRITICAL CARE  IP CONSULT TO DIETITIAN  IP CONSULT TO NEPHROLOGY    Disposition:     Condition at Discharge: Terminal    Discharge Instructions/Follow-up: Patient     Code Status:  Prior               Discharge Medications: There are no discharge medications for this patient. Time Spent on discharge is more than 30 minutes in the examination, evaluation, counseling and review of medications and discharge plan. Signed:    Karma Nation MD   10/28/2021      Thank you Karina Cohn for the opportunity to be involved in this patient's care. If you have any questions or concerns please feel free to contact me at [phone].